# Patient Record
Sex: MALE | Race: OTHER
[De-identification: names, ages, dates, MRNs, and addresses within clinical notes are randomized per-mention and may not be internally consistent; named-entity substitution may affect disease eponyms.]

---

## 2018-07-20 ENCOUNTER — HOSPITAL ENCOUNTER (EMERGENCY)
Dept: HOSPITAL 25 - ED | Age: 43
Discharge: HOME | End: 2018-07-20
Payer: COMMERCIAL

## 2018-07-20 VITALS — SYSTOLIC BLOOD PRESSURE: 144 MMHG | DIASTOLIC BLOOD PRESSURE: 79 MMHG
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
## 2018-07-20 DIAGNOSIS — Z83.3: ICD-10-CM
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
## 2018-07-20 DIAGNOSIS — Z88.0: ICD-10-CM

## 2018-07-20 DIAGNOSIS — S93.401A: Primary | ICD-10-CM

## 2018-07-20 PROCEDURE — 99281 EMR DPT VST MAYX REQ PHY/QHP: CPT

## 2018-07-20 NOTE — RAD
HISTORY: injured R ankle after fall



COMPARISONS: None



VIEWS: 3, Frontal, lateral, and oblique views of the right ankle



FINDINGS:



BONE DENSITY: Normal.

BONES: There is no displaced fracture.

JOINTS: There is no arthropathy. There is a tibiotalar effusion

ALIGNMENT: There is no dislocation. 

SOFT TISSUES: There is mild circumferential soft tissue swelling.



OTHER FINDINGS: None.



IMPRESSION: 

JOINT EFFUSION. NO ACUTE OSSEOUS INJURY. IF SYMPTOMS PERSIST, RECOMMEND REPEAT IMAGING.

## 2018-07-20 NOTE — ED
Lower Extremity





- HPI Summary


HPI Summary: 


all gone patient is a 43-year-old male presenting to the ED after a fall last 

evening down the stairs injuring his right lateral ankle.  He endorses a mild 

amount of swelling, denies ecchymosis.  He remains ambulatory.  Pain is 

currently rated a 5/10, constant and throbbing.  Denies any other injuries at 

this time.  He has never injured the ankle before.  Motrin has helped recently.








- History of Current Complaint


Chief Complaint: EDExtremityLower


Stated Complaint: RT ANKLE PAIN


Time Seen by Provider: 07/20/18 09:59


Hx Obtained From: Patient


Onset of Pain: Minutes


Onset/Duration: Minutes


Severity Initially: Moderate


Severity Currently: Moderate


Pain Intensity: 7


Pain Scale Used: 0-10 Numeric


Timing: Constant


Location: Is Discrete @ - left lateral ankle


Character Of Pain: Aching


Associated Signs And Symptoms: Positive: Swelling


Aggravating Factor(s): Standing, Ambulation


Alleviating Factor(s): Rest


Able to Bear Weight: Yes





- Risk Factors


Gout Risk Factors: Age Over 40


DVT Risk Factors: Negative


Septic Arthritis Risk Factor: Negative





- Allergies/Home Medications


Allergies/Adverse Reactions: 


 Allergies











Allergy/AdvReac Type Severity Reaction Status Date / Time


 


Penicillins Allergy  Hives Verified 07/20/18 09:52














PMH/Surg Hx/FS Hx/Imm Hx


Previously Healthy: Yes


Endocrine/Hematology History: 


   Denies: Hx Diabetes


Cardiovascular History: 


   Denies: Hx Hypertension, Hx Pacemaker/ICD


Respiratory History: Reports: Hx Sleep Apnea


   Denies: Hx Asthma


Sensory History: 


   Denies: Hx Hearing Aid


Psychiatric History: 


   Denies: Hx Panic Disorder





- Surgical History


Surgery Procedure, Year, and Place: APPY





- Immunization History


Hx Pertussis Vaccination: No


Immunizations Up to Date: Unable to Obtain/Confirm


Infectious Disease History: No


Infectious Disease History: 


   Denies: Traveled Outside the [place] in Last 30 Days





- Family History


Known Family History: Positive: Diabetes


   Negative: Cardiac Disease, Hypertension





- Social History


Occupation: Employed Full-time


Lives: Dormitory/Roommates


Alcohol Use: Occasionally


Hx Substance Use: No


Substance Use Type: Reports: None


Smoking Status (MU): Never Smoked Tobacco





Review of Systems


Constitutional: Negative


Negative: Fever, Chills, Skin Diaphoresis


Negative: Palpitations, Chest Pain


Negative: Shortness Of Breath, Cough


Positive: Arthralgia, Myalgia


Skin: Negative


Neurological: Negative


All Other Systems Reviewed And Are Negative: Yes





Physical Exam


Triage Information Reviewed: Yes


Vital Signs On Initial Exam: 


 Initial Vitals











Temp Pulse Resp BP Pulse Ox


 


 97.8 F   89   18   144/79   98 


 


 07/20/18 09:52  07/20/18 09:52  07/20/18 09:52  07/20/18 09:52  07/20/18 09:52











Vital Signs Reviewed: Yes


Appearance: Positive: Well-Appearing, Well-Nourished


Skin: Positive: Warm, Skin Color Reflects Adequate Perfusion


Head/Face: Positive: Normal Head/Face Inspection


Eyes: Positive: EOMI, ISRA, Conjunctiva Clear


Neck: Positive: Supple


Respiratory/Lung Sounds: Positive: Clear to Auscultation, Breath Sounds Present


Cardiovascular: Positive: RRR, Pulses are Symmetrical in both Upper and Lower 

Extremities


Musculoskeletal: Positive: Pain @ - right lateral ankle pain


Neurological: Positive: Speech Normal


Psychiatric: Positive: Normal


AVPU Assessment: Alert





Diagnostics





- Vital Signs


 Vital Signs











  Temp Pulse Resp BP Pulse Ox


 


 07/20/18 09:52  97.8 F  89  18  144/79  98














- Laboratory


Lab Statement: Any lab studies that have been ordered have been reviewed, and 

results considered in the medical decision making process.





Lower Extremity Course/Dx





- Course


Course Of Treatment: Course of treatment, the patient's evaluated for right 

lateral ankle injury.  He feels he may have inverted the ankle while falling 

down the stairs yesterday.  He remains ambulatory, with a mild amount of pain.  

X-ray obtained which shows mild soft tissue swelling to the lateral ankle.  No 

fracture identified.  He remains ambulatory, no crutches were given.  Ace 

wrapped.  Declines any ibuprofen.





- Diagnoses


Provider Diagnoses: 


 Right ankle sprain








Discharge





- Sign-Out/Discharge


Documenting (check all that apply): Patient Departure





- Discharge Plan


Condition: Stable


Disposition: HOME


Patient Education Materials:  Ankle Sprain (DC)


Referrals: 


Steve Stewart MD [Primary Care Provider] - 


Additional Instructions: 


RICE:





Rest, Ice, Compression, Elevate


It appears you have sprained your ankle.


No fracture was identified during this visit.


Ibuprofen 600mg three times daily - do not exceed 3 days in a row


If you continue to have symptoms - you may need further evaluation.


Keep the area ace wrapped until symptoms improve.











- Billing Disposition and Condition


Condition: STABLE


Disposition: Home

## 2019-06-26 ENCOUNTER — HOSPITAL ENCOUNTER (EMERGENCY)
Dept: HOSPITAL 25 - ED | Age: 44
Discharge: HOME | End: 2019-06-26
Payer: COMMERCIAL

## 2019-06-26 VITALS — DIASTOLIC BLOOD PRESSURE: 82 MMHG | SYSTOLIC BLOOD PRESSURE: 128 MMHG

## 2019-06-26 DIAGNOSIS — G47.30: ICD-10-CM

## 2019-06-26 DIAGNOSIS — Z95.810: ICD-10-CM

## 2019-06-26 DIAGNOSIS — R07.9: Primary | ICD-10-CM

## 2019-06-26 DIAGNOSIS — Z82.49: ICD-10-CM

## 2019-06-26 DIAGNOSIS — Z88.0: ICD-10-CM

## 2019-06-26 LAB
ALBUMIN SERPL BCG-MCNC: 4.4 G/DL (ref 3.2–5.2)
ALBUMIN/GLOB SERPL: 1.1 {RATIO} (ref 1–3)
ALP SERPL-CCNC: 92 U/L (ref 34–104)
ALT SERPL W P-5'-P-CCNC: 20 U/L (ref 7–52)
ANION GAP SERPL CALC-SCNC: 7 MMOL/L (ref 2–11)
AST SERPL-CCNC: 19 U/L (ref 13–39)
BASOPHILS # BLD AUTO: 0.1 10^3/UL (ref 0–0.2)
BUN SERPL-MCNC: 15 MG/DL (ref 6–24)
BUN/CREAT SERPL: 17 (ref 8–20)
CALCIUM SERPL-MCNC: 9.5 MG/DL (ref 8.6–10.3)
CHLORIDE SERPL-SCNC: 104 MMOL/L (ref 101–111)
EOSINOPHIL # BLD AUTO: 0.1 10^3/UL (ref 0–0.6)
GLOBULIN SER CALC-MCNC: 3.9 G/DL (ref 2–4)
GLUCOSE SERPL-MCNC: 110 MG/DL (ref 70–100)
HCO3 SERPL-SCNC: 28 MMOL/L (ref 22–32)
HCT VFR BLD AUTO: 43 % (ref 42–52)
HGB BLD-MCNC: 14.3 G/DL (ref 14–18)
INR PPP/BLD: 1.04 (ref 0.82–1.09)
LYMPHOCYTES # BLD AUTO: 2.2 10^3/UL (ref 1–4.8)
MCH RBC QN AUTO: 30 PG (ref 27–31)
MCHC RBC AUTO-ENTMCNC: 34 G/DL (ref 31–36)
MCV RBC AUTO: 89 FL (ref 80–94)
MONOCYTES # BLD AUTO: 0.5 10^3/UL (ref 0–0.8)
NEUTROPHILS # BLD AUTO: 4.7 10^3/UL (ref 1.5–7.7)
NRBC # BLD AUTO: 0 10^3/UL
NRBC BLD QL AUTO: 0.1
PLATELET # BLD AUTO: 261 10^3/UL (ref 150–450)
POTASSIUM SERPL-SCNC: 4.2 MMOL/L (ref 3.5–5)
PROT SERPL-MCNC: 8.3 G/DL (ref 6.4–8.9)
RBC # BLD AUTO: 4.8 10^6 /UL (ref 4.18–5.48)
SODIUM SERPL-SCNC: 139 MMOL/L (ref 135–145)
TROPONIN I SERPL-MCNC: 0 NG/ML (ref ?–0.04)
WBC # BLD AUTO: 7.7 10^3/UL (ref 3.5–10.8)

## 2019-06-26 PROCEDURE — 93005 ELECTROCARDIOGRAM TRACING: CPT

## 2019-06-26 PROCEDURE — 85025 COMPLETE CBC W/AUTO DIFF WBC: CPT

## 2019-06-26 PROCEDURE — 99282 EMERGENCY DEPT VISIT SF MDM: CPT

## 2019-06-26 PROCEDURE — 80053 COMPREHEN METABOLIC PANEL: CPT

## 2019-06-26 PROCEDURE — 85610 PROTHROMBIN TIME: CPT

## 2019-06-26 PROCEDURE — 84484 ASSAY OF TROPONIN QUANT: CPT

## 2019-06-26 PROCEDURE — 36415 COLL VENOUS BLD VENIPUNCTURE: CPT

## 2019-06-26 NOTE — ED
Progress





- Progress Note


Progress Note: 





This patient was signed out from Dr. Danielle to Dr. Zhu at 19:00 06/26/19 

pending troponin.  The patient had CP for months that worsened today. An EKG 

was done upon arrival that was reportedly normal per Dr. Danielle. The first 

troponin was negative and so if the 4 hour delta troponin and second troponin 

is negative the patient will be fit for discharge. The patient's troponin was 

0.00 and the patient will be discharged home and follow up with his PCP. The 

patient is agreeable with this plan.





Course/Dx





- Course


Course Of Treatment: This patient was signed out from Dr. Danielle to Dr. Zhu at 19:00 06/26/19 pending troponin.  The patient had CP for months 

that worsened today. An EKG was done upon arrival that was reportedly normal 

per Dr. Danielle. The first troponin was negative and so if the 4 hour delta 

troponin and second troponin is negative the patient will be fit for discharge. 

The patient's troponin was 0.00 and the patient will be discharged home and 

follow up with his PCP. The patient is agreeable with this plan.





- Diagnoses


Provider Diagnoses: 


 Chest pain








Discharge





- Sign-Out/Discharge


Documenting (check all that apply): Patient Departure - DC, Receiving Sign-Out


Receiving patient FROM: Kira Danielle


Patient Received Moderate/Deep Sedation with Procedure: No





- Discharge Plan


Condition: Good


Disposition: HOME


Patient Education Materials:  Chest Pain (ED)


Referrals: 


Steve Stewart MD [Primary Care Provider] - 





- Billing Disposition and Condition


Condition: GOOD


Disposition: Home





- Attestation Statements


Document Initiated by Kwasi: Yes


Documenting Scribe: Blaise Tyler


Provider For Whom Kwasi is Documenting (Include Credential): Geoffrey Zhu MD


Scribe Attestation: 


Blaise HOLLOWAY scribed for Geoffrey Zhu MD on 06/27/19 at 0548. 


Scribe Documentation Reviewed: Yes


Provider Attestation: 


The documentation as recorded by the Blaise alexis accurately 

reflects the service I personally performed and the decisions made by me, Geoffrey Zhu MD


Status of Scribe Document: Viewed

## 2019-06-26 NOTE — ED
HPI Chest Pain





- HPI Summary


HPI Summary: 





Pt is a 42 y/o M presenting to the ED with a chief complaint of chest pain 

initially onset months ago, but today he got tired of having it so he came to 

be checked out. The pain is in the middle of his chest, rated at an 8/10 in 

severity when it comes on, described as tightness/squeezing, and radiating to 

the back. He reports occasional SOB. He denies nausea or diaphoresis. Cardiac 

Risks: neg HTN, DM, hyperlipidemia, smoking. + grandmother  at age 45 of 

MI. 





 Home Medications











 Medication  Instructions  Recorded  Confirmed  Type


 


NK [No Home Medications Reported]  13 History














- History of Current Complaint


Chief Complaint: EDChestPainROMI


Time Seen by Provider: 19 18:31


Hx Obtained From: Patient, Family/Caretaker - girlfriend Chastity


Onset/Duration: Started Weeks Ago, Still Present


Timing: Intermittent, Lasting Hours


Initial Severity: Moderate


Current Severity: None


Pain Intensity: 0


Pain Scale Used: 0-10 Numeric


Chest Pain Location: Mid Sternal


Chest Pain Radiates: Yes


Chest Pain Radiates To:: Back


Character: Pressure/Squeezing, Tightness


Aggravating Factor(s): Nothing


Alleviating Factor(s): Nothing


Associated Signs and Symptoms: Positive: Chest Pain, Shortness of Breath.  

Negative: Diaphoresis, Nausea





- Allergy/Home Medications


Allergies/Adverse Reactions: 


 Allergies











Allergy/AdvReac Type Severity Reaction Status Date / Time


 


Penicillins Allergy  Hives Verified 19 16:18














PMH/Surg Hx/FS Hx/Imm Hx


Previously Healthy: Yes


Endocrine/Hematology History: 


   Denies: Hx Diabetes


Cardiovascular History: 


   Denies: Hx Hypertension, Hx Pacemaker/ICD


Respiratory History: Reports: Hx Sleep Apnea - uses his CPAP


   Denies: Hx Asthma


Psychiatric History: 


   Denies: Hx Panic Disorder





- Surgical History


Surgery Procedure, Year, and Place: APPY


Infectious Disease History: No


Infectious Disease History: 


   Denies: Traveled Outside the [place] in Last 30 Days





- Family History


Known Family History: Positive: Diabetes, Other - grandmother  age 45


   Negative: Cardiac Disease, Hypertension





- Social History


Alcohol Use: Occasionally


Hx Substance Use: No


Substance Use Type: Reports: None


Hx Tobacco Use: No


Smoking Status (MU): Never Smoked Tobacco





Review of Systems


Negative: Skin Diaphoresis


Positive: Chest Pain


Positive: Shortness Of Breath


Negative: Nausea


Positive: no symptoms reported


Musculoskeletal: Negative


Skin: Negative


Neurological: Negative


Psychological: Normal


All Other Systems Reviewed And Are Negative: Yes





Physical Exam





- Summary


Physical Exam Summary: 





Appearance: Well-appearing, no pain distress, well-nourished


Skin: Warm, color reflects adequate perfusion, dry


Head: Normal Head/Face inspection, atraumatic


Eyes: Conjunctiva clear


ENT: Normal inspection


Neck: Supple, no nodes, no JVD


Respiratory: Lungs clear, normal breath sounds, no respiratory distress


Cardio: RRR, No murmur, pulses normal, brisk capillary refill


Abdomen: Soft, nontender


Bowel sounds: Present 


Musculoskeletal: Strength Intact/ROM intact, no calf tenderness, no edema.


Psychological: Normal


Neuro: Alert, muscle tone normal, no focal deficit





Triage Information Reviewed: Yes


Vital Signs On Initial Exam: 


 Initial Vitals











Temp Pulse Resp BP Pulse Ox


 


 97.6 F   89   16   174/94   99 


 


 19 16:15  19 16:15  19 16:15  19 16:15  19 16:15











Vital Signs Reviewed: Yes





Diagnostics





- Vital Signs


 Vital Signs











  Temp Pulse Resp BP Pulse Ox


 


 19 18:11  98.4 F  82  16  131/74  98


 


 19 16:15  97.6 F  89  16  174/94  99














- Laboratory


Lab Results: 


 Lab Results











  19 Range/Units





  16:25 16:25 16:25 


 


WBC  7.7    (3.5-10.8)  10^3/uL


 


RBC  4.80    (4.18-5.48)  10^6 /uL


 


Hgb  14.3    (14.0-18.0)  g/dL


 


Hct  43    (42-52)  %


 


MCV  89    (80-94)  fL


 


MCH  30    (27-31)  pg


 


MCHC  34    (31-36)  g/dL


 


RDW  14    (10-15)  %


 


Plt Count  261    (150-450)  10^3/uL


 


MPV  8.6    (7.4-10.4)  fL


 


Neut % (Auto)  61.8    %


 


Lymph % (Auto)  29.1    %


 


Mono % (Auto)  7.2    %


 


Eos % (Auto)  1.0    %


 


Baso % (Auto)  0.9    %


 


Absolute Neuts (auto)  4.7    (1.5-7.7)  10^3/ul


 


Absolute Lymphs (auto)  2.2    (1.0-4.8)  10^3/ul


 


Absolute Monos (auto)  0.5    (0-0.8)  10^3/ul


 


Absolute Eos (auto)  0.1    (0-0.6)  10^3/ul


 


Absolute Basos (auto)  0.1    (0-0.2)  10^3/ul


 


Absolute Nucleated RBC  0.0    10^3/ul


 


Nucleated RBC %  0.1    


 


INR (Anticoag Therapy)   1.04   (0.82-1.09)  


 


Sodium    139  (135-145)  mmol/L


 


Potassium    4.2  (3.5-5.0)  mmol/L


 


Chloride    104  (101-111)  mmol/L


 


Carbon Dioxide    28  (22-32)  mmol/L


 


Anion Gap    7  (2-11)  mmol/L


 


BUN    15  (6-24)  mg/dL


 


Creatinine    0.88  (0.67-1.17)  mg/dL


 


Est GFR ( Amer)    114.4  (>60)  


 


Est GFR (Non-Af Amer)    94.5  (>60)  


 


BUN/Creatinine Ratio    17.0  (8-20)  


 


Glucose    110 H  ()  mg/dL


 


Calcium    9.5  (8.6-10.3)  mg/dL


 


Total Bilirubin    0.60  (0.2-1.0)  mg/dL


 


AST    19  (13-39)  U/L


 


ALT    20  (7-52)  U/L


 


Alkaline Phosphatase    92  ()  U/L


 


Troponin I    0.00  (<0.04)  ng/mL


 


Total Protein    8.3  (6.4-8.9)  g/dL


 


Albumin    4.4  (3.2-5.2)  g/dL


 


Globulin    3.9  (2-4)  g/dL


 


Albumin/Globulin Ratio    1.1  (1-3)  











Result Diagrams: 


 19 16:25





 19 16:25


Lab Statement: Any lab studies that have been ordered have been reviewed, and 

results considered in the medical decision making process.





- EKG


  ** 1621


Cardiac Rate: NL - 86bpm


EKG Rhythm: Sinus Rhythm


ST Segment: Non-Specific


Ectopy: None


Summary of EKG Findings: EKG at 1621 shows NSR at 86bpm with nml AV/IV CT, nml 

QTc, and nml axis. No acute changes. ED MD has reviewed and interpreted this 

EKG.





Chest Pain Course/Dx





- Course


Course Of Treatment: Pt is a 42 y/o M presenting to the ED with a chief 

complaint of chest pain intermittently for months. The pain is in the middle of 

his chest, rated at an 8/10 in severity when it comes on, described as tightness

/squeezing, and radiating to the back. He reports occasional SOB. He denies 

nausea or diaphoresis.  Hx of sleep apnea, FHx grandmother  at 46y/o, he 

does not smoke and drinking alcohol is rare.  Pt will be signed out to Dr. Zhu at 1900 on 19 pending further workup.  EKG at 1621 shows NSR at 

86bpm with nml AV/IV CT, nml QTc, and nml axis. No acute changes. ED MD has 

reviewed and interpreted this EKG.  Pt is pain free, and his girlfriend 

Chastity is with him at the time of sign-out, 0700 19





- Chest Pain


Differential Diagnosis/HQI/PQRI: Acute MI, ACS, Angina, Chest Wall, GI Disease, 

Lower Respiratory Infection, Pulmonary Embolism





- Diagnoses


Provider Diagnoses: 


 Chest pain








Discharge





- Sign-Out/Discharge


Documenting (check all that apply): Sign-Out Patient


Signing out patient TO: Geoffrey Zhu - [id number]


Patient Received Moderate/Deep Sedation with Procedure: No





- Discharge Plan


Condition: Good


Disposition: HOME


Patient Education Materials:  Chest Pain (ED)


Referrals: 


Steve Stewart MD [Primary Care Provider] - 





- Billing Disposition and Condition


Condition: GOOD


Disposition: Home





- Attestation Statements


Document Initiated by Scribe: Yes


Documenting Scribe: eKlli Gann


Provider For Whom Kwasi is Documenting (Include Credential): Dr. Kira Danielle MD.


Scribe Attestation: 


Kelli HOLLOWAY scribed for Dr. Kira Danielle MD. on 19 at 

0156. 


Scribe Documentation Reviewed: Yes


Provider Attestation: 


The documentation as recorded by the Kelli alexis accurately 

reflects the service I personally performed and the decisions made by me, Dr. Kira Danielle MD.


Status of Scribe Document: Viewed

## 2020-02-20 ENCOUNTER — HOSPITAL ENCOUNTER (OUTPATIENT)
Dept: HOSPITAL 25 - OR | Age: 45
Discharge: HOME | End: 2020-02-20
Attending: INTERNAL MEDICINE
Payer: COMMERCIAL

## 2020-02-20 VITALS — SYSTOLIC BLOOD PRESSURE: 128 MMHG | DIASTOLIC BLOOD PRESSURE: 76 MMHG

## 2020-02-20 DIAGNOSIS — D12.2: ICD-10-CM

## 2020-02-20 DIAGNOSIS — K62.1: ICD-10-CM

## 2020-02-20 DIAGNOSIS — R93.3: ICD-10-CM

## 2020-02-20 DIAGNOSIS — R13.10: Primary | ICD-10-CM

## 2020-02-20 DIAGNOSIS — K20.8: ICD-10-CM

## 2020-02-20 DIAGNOSIS — R10.32: ICD-10-CM

## 2020-02-20 PROCEDURE — 88305 TISSUE EXAM BY PATHOLOGIST: CPT

## 2020-02-20 PROCEDURE — 87077 CULTURE AEROBIC IDENTIFY: CPT

## 2020-02-21 NOTE — PRO
CC:  Dr. Steve Stewart *

 

ESOPHAGOGASTRODUODENOSCOPY AND COLONOSCOPY REPORT:

 

DATE OF PROCEDURE:  02/20/20 - [facility]

 

PRIMARY CARE PHYSICIAN:  Dr. Steve Stewart.

 

INDICATION FOR PROCEDURE:  Dysphagia, left lower quadrant pain.

 

PROCEDURES PERFORMED:  Complete esophagogastroduodenoscopy with biopsies and 
complete colonoscopy to the terminal ileum with biopsy polypectomy x2.

 

MEDICATIONS GIVEN:  Please see Anesthesia record.

 

DESCRIPTION OF PROCEDURE:  After the EGD and colonoscopy procedures, including 
the risks, benefits, and alternatives with the risks not limited to perforation
, surgery, missed lesions, and/or death were explained to the patient, written 
informed consent was obtained, IV medication was given, and a bite-block was 
placed between the teeth.  The adult Olympus gastroscope was then inserted into 
the patient's oropharynx into the tubular esophagus.  Tubular esophagus had LA-
A erosive esophagitis.  This was biopsied.  Biopsies were also taken of the mid 
esophagus to rule out EoE.  Scope was advanced through the lower esophageal 
sphincter into the stomach.  Direct views showed antral-predominant gastritis. 
This was biopsied with CLOtesting.  On retroflexion, a small 1 to 2 cm hiatal 
hernia was appreciated.  Scope was then advanced through the widely patent 
pylorus into the duodenal bulb, C-loop, and distal duodenum.  These were normal 
in appearance.  Scope was then removed from the patient.  He tolerated the 
procedure well.  He was then rotated, given additional IV sedation medication, 
and a rectal exam was performed.  The rectal exam was unremarkable.  The adult 
Olympus colonoscope was then inserted into the patient's rectum and advanced 
very carefully through the entirety of the colon into the cecal base.  Cecal 
base was carefully inspected and normal in appearance.  The terminal ileum was 
identified and intubated x3 to 4 cm and normal.  The scope was then returned to 
the cecum.  A photograph was taken of the cecal cap.  The quality of the 
preparation was good. Over the next 10 minutes, the scope was carefully 
withdrawn inspecting the mucosa. A polyp in the ascending colon was removed 
with biopsy polypectomy and a further polyp in the rectum was removed with 
biopsy polypectomy in entirety.  There was mild left-sided diverticulosis coli.
  On return to the rectum, direct views were normal.  On retroflexion, the 
views were normal as well.  Scope was then removed from the patient.  He 
tolerated the procedure well.  He returned to the recovery room in stable 
condition.

 

IMPRESSION:

1.  Complete esophagogastroduodenoscopy with biopsies.

2.  LA-A erosive reflux.

3.  Small hiatal hernia.

4.  Mild gastritis.

5.  Normal duodenum.

6.  Complete colonoscopy to the terminal ileum.

7.  Biopsy polypectomy x2 as above.

8.  Mild left-sided diverticulosis coli.

9.  Good prep.

 

RECOMMENDATIONS:  Suspect fiber will help the pain on the left lower quadrant. 
Suspect that he is having difficulty with some stool through the diverticula in 
this area and this will be beneficial.  In addition, recommend daily PPI 
therapy for the erosive reflux, which is likely the etiology of his dysphagia, 
although I will follow up on results of the biopsies.  We will likely have him 
follow up with MADAI Alvarado, in the office for further management if 
ongoing issues. From a screening standpoint, he should have a repeat 
colonoscopy in 5 years' time given the two polyps removed today.

 

 [id number]/CPS #: [id number]

MAR